# Patient Record
Sex: MALE | Race: WHITE | NOT HISPANIC OR LATINO | Employment: FULL TIME | URBAN - NONMETROPOLITAN AREA
[De-identification: names, ages, dates, MRNs, and addresses within clinical notes are randomized per-mention and may not be internally consistent; named-entity substitution may affect disease eponyms.]

---

## 2017-05-06 ENCOUNTER — APPOINTMENT (OUTPATIENT)
Dept: GENERAL RADIOLOGY | Facility: HOSPITAL | Age: 42
End: 2017-05-06

## 2017-05-06 ENCOUNTER — APPOINTMENT (OUTPATIENT)
Dept: CT IMAGING | Facility: HOSPITAL | Age: 42
End: 2017-05-06

## 2017-05-06 ENCOUNTER — HOSPITAL ENCOUNTER (EMERGENCY)
Facility: HOSPITAL | Age: 42
Discharge: HOME | End: 2017-05-06
Attending: EMERGENCY MEDICINE | Admitting: EMERGENCY MEDICINE

## 2017-05-06 VITALS
TEMPERATURE: 98.7 F | BODY MASS INDEX: 22.73 KG/M2 | OXYGEN SATURATION: 99 % | SYSTOLIC BLOOD PRESSURE: 118 MMHG | HEART RATE: 78 BPM | WEIGHT: 150 LBS | DIASTOLIC BLOOD PRESSURE: 77 MMHG | RESPIRATION RATE: 18 BRPM | HEIGHT: 68 IN

## 2017-05-06 DIAGNOSIS — S42.254A CLOSED NONDISPLACED FRACTURE OF GREATER TUBEROSITY OF RIGHT HUMERUS, INITIAL ENCOUNTER: Primary | ICD-10-CM

## 2017-05-06 DIAGNOSIS — S46.001A ROTATOR CUFF INJURY, RIGHT, INITIAL ENCOUNTER: ICD-10-CM

## 2017-05-06 LAB
ALBUMIN SERPL-MCNC: 4.3 G/DL (ref 3.5–5)
ALBUMIN/GLOB SERPL: 1.3 G/DL (ref 1.5–2.5)
ALP SERPL-CCNC: 93 U/L (ref 40–129)
ALT SERPL W P-5'-P-CCNC: 39 U/L (ref 10–44)
ANION GAP SERPL CALCULATED.3IONS-SCNC: 3 MMOL/L (ref 3.6–11.2)
AST SERPL-CCNC: 43 U/L (ref 10–34)
BASOPHILS # BLD AUTO: 0.03 10*3/MM3 (ref 0–0.3)
BASOPHILS NFR BLD AUTO: 0.2 % (ref 0–2)
BILIRUB SERPL-MCNC: 0.5 MG/DL (ref 0.2–1.8)
BUN BLD-MCNC: 9 MG/DL (ref 7–21)
BUN/CREAT SERPL: 11.1 (ref 7–25)
CALCIUM SPEC-SCNC: 9.9 MG/DL (ref 7.7–10)
CHLORIDE SERPL-SCNC: 107 MMOL/L (ref 99–112)
CO2 SERPL-SCNC: 29 MMOL/L (ref 24.3–31.9)
CREAT BLD-MCNC: 0.81 MG/DL (ref 0.43–1.29)
DEPRECATED RDW RBC AUTO: 43.9 FL (ref 37–54)
EOSINOPHIL # BLD AUTO: 0.26 10*3/MM3 (ref 0–0.7)
EOSINOPHIL NFR BLD AUTO: 2 % (ref 0–5)
ERYTHROCYTE [DISTWIDTH] IN BLOOD BY AUTOMATED COUNT: 13.4 % (ref 11.5–14.5)
GFR SERPL CREATININE-BSD FRML MDRD: 105 ML/MIN/1.73
GLOBULIN UR ELPH-MCNC: 3.3 GM/DL
GLUCOSE BLD-MCNC: 153 MG/DL (ref 70–110)
HCT VFR BLD AUTO: 44.2 % (ref 42–52)
HGB BLD-MCNC: 14.6 G/DL (ref 14–18)
IMM GRANULOCYTES # BLD: 0.04 10*3/MM3 (ref 0–0.03)
IMM GRANULOCYTES NFR BLD: 0.3 % (ref 0–0.5)
LYMPHOCYTES # BLD AUTO: 3.15 10*3/MM3 (ref 1–3)
LYMPHOCYTES NFR BLD AUTO: 24.8 % (ref 21–51)
MCH RBC QN AUTO: 30.2 PG (ref 27–33)
MCHC RBC AUTO-ENTMCNC: 33 G/DL (ref 33–37)
MCV RBC AUTO: 91.3 FL (ref 80–94)
MONOCYTES # BLD AUTO: 0.61 10*3/MM3 (ref 0.1–0.9)
MONOCYTES NFR BLD AUTO: 4.8 % (ref 0–10)
NEUTROPHILS # BLD AUTO: 8.6 10*3/MM3 (ref 1.4–6.5)
NEUTROPHILS NFR BLD AUTO: 67.9 % (ref 30–70)
OSMOLALITY SERPL CALC.SUM OF ELEC: 279.3 MOSM/KG (ref 273–305)
PLATELET # BLD AUTO: 227 10*3/MM3 (ref 130–400)
PMV BLD AUTO: 10.9 FL (ref 6–10)
POTASSIUM BLD-SCNC: 3.8 MMOL/L (ref 3.5–5.3)
PROT SERPL-MCNC: 7.6 G/DL (ref 6–8)
RBC # BLD AUTO: 4.84 10*6/MM3 (ref 4.7–6.1)
SODIUM BLD-SCNC: 139 MMOL/L (ref 135–153)
WBC NRBC COR # BLD: 12.69 10*3/MM3 (ref 4.5–12.5)

## 2017-05-06 PROCEDURE — 96372 THER/PROPH/DIAG INJ SC/IM: CPT

## 2017-05-06 PROCEDURE — 70450 CT HEAD/BRAIN W/O DYE: CPT | Performed by: RADIOLOGY

## 2017-05-06 PROCEDURE — 85025 COMPLETE CBC W/AUTO DIFF WBC: CPT | Performed by: NURSE PRACTITIONER

## 2017-05-06 PROCEDURE — 99283 EMERGENCY DEPT VISIT LOW MDM: CPT

## 2017-05-06 PROCEDURE — 73030 X-RAY EXAM OF SHOULDER: CPT | Performed by: RADIOLOGY

## 2017-05-06 PROCEDURE — 25010000002 KETOROLAC TROMETHAMINE PER 15 MG: Performed by: NURSE PRACTITIONER

## 2017-05-06 PROCEDURE — 70450 CT HEAD/BRAIN W/O DYE: CPT

## 2017-05-06 PROCEDURE — 80053 COMPREHEN METABOLIC PANEL: CPT | Performed by: NURSE PRACTITIONER

## 2017-05-06 PROCEDURE — 73030 X-RAY EXAM OF SHOULDER: CPT

## 2017-05-06 RX ORDER — HYDROCODONE BITARTRATE AND ACETAMINOPHEN 7.5; 325 MG/1; MG/1
1 TABLET ORAL EVERY 6 HOURS PRN
Qty: 12 TABLET | Refills: 0 | Status: SHIPPED | OUTPATIENT
Start: 2017-05-06 | End: 2023-01-23

## 2017-05-06 RX ORDER — KETOROLAC TROMETHAMINE 30 MG/ML
30 INJECTION, SOLUTION INTRAMUSCULAR; INTRAVENOUS ONCE
Status: COMPLETED | OUTPATIENT
Start: 2017-05-06 | End: 2017-05-06

## 2017-05-06 RX ADMIN — KETOROLAC TROMETHAMINE 30 MG: 30 INJECTION, SOLUTION INTRAMUSCULAR; INTRAVENOUS at 17:51

## 2017-05-22 ENCOUNTER — HOSPITAL ENCOUNTER (OUTPATIENT)
Dept: HOSPITAL 79 - CT | Age: 42
End: 2017-05-22
Attending: NURSE PRACTITIONER
Payer: COMMERCIAL

## 2017-05-22 DIAGNOSIS — S42.131A: ICD-10-CM

## 2017-05-22 DIAGNOSIS — M24.411: ICD-10-CM

## 2017-05-22 DIAGNOSIS — S52.109A: Primary | ICD-10-CM

## 2017-05-22 DIAGNOSIS — S42.291A: ICD-10-CM

## 2017-05-31 ENCOUNTER — HOSPITAL ENCOUNTER (OUTPATIENT)
Dept: HOSPITAL 79 - OR | Age: 42
Discharge: HOME | End: 2017-05-31
Attending: ORTHOPAEDIC SURGERY
Payer: COMMERCIAL

## 2017-05-31 VITALS — HEIGHT: 68 IN | WEIGHT: 150 LBS | BODY MASS INDEX: 22.73 KG/M2

## 2017-05-31 DIAGNOSIS — Z79.1: ICD-10-CM

## 2017-05-31 DIAGNOSIS — E11.9: ICD-10-CM

## 2017-05-31 DIAGNOSIS — S42.91XA: Primary | ICD-10-CM

## 2017-05-31 DIAGNOSIS — S46.011A: ICD-10-CM

## 2017-05-31 DIAGNOSIS — Z79.899: ICD-10-CM
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
## 2017-05-31 DIAGNOSIS — M19.90: ICD-10-CM

## 2017-05-31 DIAGNOSIS — F17.210: ICD-10-CM

## 2017-05-31 LAB
BUN/CREATININE RATIO: 13 (ref 0–10)
HGB BLD-MCNC: 15.1 GM/DL (ref 14–17.5)
RED BLOOD COUNT: 4.92 M/UL (ref 4.2–5.5)
WHITE BLOOD COUNT: 5.4 K/UL (ref 4.5–11)

## 2017-05-31 PROCEDURE — 0RQJ0ZZ REPAIR RIGHT SHOULDER JOINT, OPEN APPROACH: ICD-10-PCS | Performed by: ORTHOPAEDIC SURGERY

## 2017-05-31 PROCEDURE — 0PSC04Z REPOSITION RIGHT HUMERAL HEAD WITH INTERNAL FIXATION DEVICE, OPEN APPROACH: ICD-10-PCS | Performed by: ORTHOPAEDIC SURGERY

## 2018-10-12 ENCOUNTER — APPOINTMENT (OUTPATIENT)
Dept: CT IMAGING | Facility: HOSPITAL | Age: 43
End: 2018-10-12

## 2018-10-12 ENCOUNTER — HOSPITAL ENCOUNTER (EMERGENCY)
Facility: HOSPITAL | Age: 43
Discharge: HOME | End: 2018-10-12
Attending: EMERGENCY MEDICINE | Admitting: EMERGENCY MEDICINE

## 2018-10-12 ENCOUNTER — APPOINTMENT (OUTPATIENT)
Dept: GENERAL RADIOLOGY | Facility: HOSPITAL | Age: 43
End: 2018-10-12

## 2018-10-12 VITALS
SYSTOLIC BLOOD PRESSURE: 136 MMHG | HEIGHT: 68 IN | WEIGHT: 165 LBS | HEART RATE: 68 BPM | RESPIRATION RATE: 16 BRPM | BODY MASS INDEX: 25.01 KG/M2 | OXYGEN SATURATION: 99 % | DIASTOLIC BLOOD PRESSURE: 92 MMHG | TEMPERATURE: 97.9 F

## 2018-10-12 DIAGNOSIS — L02.11 NECK ABSCESS: Primary | ICD-10-CM

## 2018-10-12 LAB
ALBUMIN SERPL-MCNC: 4.5 G/DL (ref 3.5–5)
ALBUMIN/GLOB SERPL: 1.4 G/DL (ref 1.5–2.5)
ALP SERPL-CCNC: 103 U/L (ref 40–129)
ALT SERPL W P-5'-P-CCNC: 42 U/L (ref 10–44)
ANION GAP SERPL CALCULATED.3IONS-SCNC: 3.3 MMOL/L (ref 3.6–11.2)
AST SERPL-CCNC: 38 U/L (ref 10–34)
BASOPHILS # BLD AUTO: 0.02 10*3/MM3 (ref 0–0.3)
BASOPHILS NFR BLD AUTO: 0.3 % (ref 0–2)
BILIRUB SERPL-MCNC: 0.4 MG/DL (ref 0.2–1.8)
BUN BLD-MCNC: 9 MG/DL (ref 7–21)
BUN/CREAT SERPL: 9.5 (ref 7–25)
CALCIUM SPEC-SCNC: 9.4 MG/DL (ref 7.7–10)
CHLORIDE SERPL-SCNC: 111 MMOL/L (ref 99–112)
CO2 SERPL-SCNC: 26.7 MMOL/L (ref 24.3–31.9)
CREAT BLD-MCNC: 0.95 MG/DL (ref 0.43–1.29)
DEPRECATED RDW RBC AUTO: 42.7 FL (ref 37–54)
EOSINOPHIL # BLD AUTO: 0.24 10*3/MM3 (ref 0–0.7)
EOSINOPHIL NFR BLD AUTO: 3.2 % (ref 0–5)
ERYTHROCYTE [DISTWIDTH] IN BLOOD BY AUTOMATED COUNT: 13 % (ref 11.5–14.5)
GFR SERPL CREATININE-BSD FRML MDRD: 87 ML/MIN/1.73
GLOBULIN UR ELPH-MCNC: 3.3 GM/DL
GLUCOSE BLD-MCNC: 186 MG/DL (ref 70–110)
HCT VFR BLD AUTO: 46.3 % (ref 42–52)
HGB BLD-MCNC: 15.9 G/DL (ref 14–18)
IMM GRANULOCYTES # BLD: 0.02 10*3/MM3 (ref 0–0.03)
IMM GRANULOCYTES NFR BLD: 0.3 % (ref 0–0.5)
LYMPHOCYTES # BLD AUTO: 3.23 10*3/MM3 (ref 1–3)
LYMPHOCYTES NFR BLD AUTO: 43.1 % (ref 21–51)
MCH RBC QN AUTO: 31.1 PG (ref 27–33)
MCHC RBC AUTO-ENTMCNC: 34.3 G/DL (ref 33–37)
MCV RBC AUTO: 90.6 FL (ref 80–94)
MONOCYTES # BLD AUTO: 0.38 10*3/MM3 (ref 0.1–0.9)
MONOCYTES NFR BLD AUTO: 5.1 % (ref 0–10)
NEUTROPHILS # BLD AUTO: 3.6 10*3/MM3 (ref 1.4–6.5)
NEUTROPHILS NFR BLD AUTO: 48 % (ref 30–70)
OSMOLALITY SERPL CALC.SUM OF ELEC: 284.8 MOSM/KG (ref 273–305)
PLATELET # BLD AUTO: 215 10*3/MM3 (ref 130–400)
PMV BLD AUTO: 11.3 FL (ref 6–10)
POTASSIUM BLD-SCNC: 4.1 MMOL/L (ref 3.5–5.3)
PROT SERPL-MCNC: 7.8 G/DL (ref 6–8)
RBC # BLD AUTO: 5.11 10*6/MM3 (ref 4.7–6.1)
SODIUM BLD-SCNC: 141 MMOL/L (ref 135–153)
WBC NRBC COR # BLD: 7.49 10*3/MM3 (ref 4.5–12.5)

## 2018-10-12 PROCEDURE — 85025 COMPLETE CBC W/AUTO DIFF WBC: CPT | Performed by: PHYSICIAN ASSISTANT

## 2018-10-12 PROCEDURE — 99283 EMERGENCY DEPT VISIT LOW MDM: CPT

## 2018-10-12 PROCEDURE — 70491 CT SOFT TISSUE NECK W/DYE: CPT

## 2018-10-12 PROCEDURE — 25010000002 IOPAMIDOL 61 % SOLUTION: Performed by: EMERGENCY MEDICINE

## 2018-10-12 PROCEDURE — 71046 X-RAY EXAM CHEST 2 VIEWS: CPT

## 2018-10-12 PROCEDURE — 80053 COMPREHEN METABOLIC PANEL: CPT | Performed by: PHYSICIAN ASSISTANT

## 2018-10-12 PROCEDURE — 71046 X-RAY EXAM CHEST 2 VIEWS: CPT | Performed by: RADIOLOGY

## 2018-10-12 PROCEDURE — 70491 CT SOFT TISSUE NECK W/DYE: CPT | Performed by: RADIOLOGY

## 2018-10-12 RX ORDER — DOXYCYCLINE 100 MG/1
100 CAPSULE ORAL 2 TIMES DAILY
Qty: 20 CAPSULE | Refills: 0 | Status: SHIPPED | OUTPATIENT
Start: 2018-10-12 | End: 2023-01-23

## 2018-10-12 RX ORDER — CEPHALEXIN 500 MG/1
500 CAPSULE ORAL 3 TIMES DAILY
Qty: 30 CAPSULE | Refills: 0 | Status: SHIPPED | OUTPATIENT
Start: 2018-10-12 | End: 2023-01-23

## 2018-10-12 RX ORDER — SODIUM CHLORIDE 0.9 % (FLUSH) 0.9 %
10 SYRINGE (ML) INJECTION AS NEEDED
Status: DISCONTINUED | OUTPATIENT
Start: 2018-10-12 | End: 2018-10-12 | Stop reason: HOSPADM

## 2018-10-12 RX ORDER — LIDOCAINE HYDROCHLORIDE 10 MG/ML
5 INJECTION, SOLUTION EPIDURAL; INFILTRATION; INTRACAUDAL; PERINEURAL ONCE
Status: COMPLETED | OUTPATIENT
Start: 2018-10-12 | End: 2018-10-12

## 2018-10-12 RX ADMIN — IOPAMIDOL 90 ML: 612 INJECTION, SOLUTION INTRAVENOUS at 15:31

## 2018-10-12 RX ADMIN — LIDOCAINE HYDROCHLORIDE 5 ML: 10 INJECTION, SOLUTION EPIDURAL; INFILTRATION; INTRACAUDAL; PERINEURAL at 16:08

## 2018-10-12 NOTE — DISCHARGE INSTRUCTIONS
Call one of the offices below to establish a primary care provider.  If you are unable to get an appointment and feel it is an emergency and need to be seen immediately please return to the Emergency Department.    Call one of the office below to set up a primary care provider.    Dr. Ganesh Garcia                                                                                                       [address] [facility] [address]  [phone]    Dr. Yepez, Dr. REKHA Thompson, Dr. JOSEFA Thompson ([facility])  [address] [address]  [phone]    Dr. Lr, Dr. Pino, Dr. Guzmán ([facility])  [address] [facility] [address]  [phone]    Dr. Wilson  [address] [facility] [address]  [phone]    Dr. Fletcher, Dr. Valle, Dr. José, Dr. Pantoja ([facility])  [address] [place] [address] [address]  [facility] [address]  [phone]    Dr. Madelyn Funez  [address] [facility] [place] [address]  [phone]    Dr. Ivanna Johnston  [address]   [address]  [facility] [address]  [phone]    Dr. Garcia  [address] [facility] [address]  [phone]    Dr. Padron, Dr. Ba  [address] [facility] [place] [address]  [phone]    Dr. Bartlett  [address]                                                              [address]  [facility] [address]  [phone]    Dr. Springer  [address] [facility] [id number] [phone]    Dr. Della Neri  [address]  [address]  [facility] [address]  [phone]

## 2018-10-12 NOTE — ED PROVIDER NOTES
Subjective   43-year-old white male presents secondary to a swollen area in his anterior neck.  He states this started 3 days ago.  It has increased.  He denies any fever.  No redness or pus.  He voices no other complaints at this time.            Review of Systems   Constitutional: Negative.  Negative for fever.   HENT: Negative.    Respiratory: Negative.    Cardiovascular: Negative.  Negative for chest pain.   Gastrointestinal: Negative.  Negative for abdominal pain.   Endocrine: Negative.    Genitourinary: Negative.  Negative for dysuria.   Skin: Negative.    Neurological: Negative.    Psychiatric/Behavioral: Negative.    All other systems reviewed and are negative.      Past Medical History:   Diagnosis Date   • Bladder infection    • Compression fracture of thoracic vertebra (CMS/HCC)    • Migraine    • Prostate enlargement        No Known Allergies    Past Surgical History:   Procedure Laterality Date   • ORBITAL FRACTURE SURGERY     • SHOULDER ARTHROSCOPY W/ ROTATOR CUFF REPAIR     • SINUS SURGERY         Family History   Problem Relation Age of Onset   • Hypertension Mother    • Cancer Maternal Uncle    • Cancer Maternal Grandmother        Social History     Social History   • Marital status:      Social History Main Topics   • Smoking status: Current Every Day Smoker     Packs/day: 1.00     Types: Cigarettes   • Smokeless tobacco: Never Used   • Alcohol use No   • Drug use: No   • Sexual activity: Defer     Other Topics Concern   • Not on file           Objective   Physical Exam   Constitutional: He is oriented to person, place, and time. He appears well-developed and well-nourished. No distress.   HENT:   Head: Normocephalic and atraumatic.   Right Ear: External ear normal.   Left Ear: External ear normal.   Nose: Nose normal.   Quarter size area of firm swollen mass midline of his anterior neck.  No definite abscess.  No surrounding redness.  I do not appreciate any lymphadenopathy in his neck or  axilla or supraclavicular regions.   Eyes: Pupils are equal, round, and reactive to light. Conjunctivae and EOM are normal.   Neck: Normal range of motion. Neck supple. No JVD present. No tracheal deviation present.   Cardiovascular: Normal rate, regular rhythm and normal heart sounds.    No murmur heard.  Pulmonary/Chest: Effort normal and breath sounds normal. No respiratory distress. He has no wheezes.   Abdominal: Soft. Bowel sounds are normal. There is no tenderness.   Musculoskeletal: Normal range of motion. He exhibits no edema or deformity.   Neurological: He is alert and oriented to person, place, and time. No cranial nerve deficit.   Skin: Skin is warm and dry. No rash noted. He is not diaphoretic. No erythema. No pallor.   Psychiatric: He has a normal mood and affect. His behavior is normal. Thought content normal.   Nursing note and vitals reviewed.      Incision & Drainage  Date/Time: 10/12/2018 6:11 PM  Performed by: ONEIL BONILLA  Authorized by: NAYELI LAWRENCE     Location:     Type:  Abscess    Location:  Neck    Neck location: anterior.  Pre-procedure details:     Skin preparation:  Betadine  Anesthesia (see MAR for exact dosages):     Anesthesia method:  Local infiltration    Local anesthetic:  Lidocaine 1% w/o epi  Procedure type:     Complexity:  Simple  Procedure details:     Incision types:  Single straight    Incision depth:  Subcutaneous    Scalpel blade:  11    Drainage:  Purulent    Drainage amount:  Moderate    Wound treatment:  Wound left open    Packing materials:  None  Post-procedure details:     Patient tolerance of procedure:  Tolerated well, no immediate complications               ED Course  ED Course as of Oct 12 1812   Fri Oct 12, 2018   1811 Seen with Dr Lawrence-proceed with I & D.  [JI]      ED Course User Index  [JI] Oneil Bonilla PA                  MDM  Number of Diagnoses or Management Options  Neck abscess: new and requires workup     Amount and/or Complexity of Data  Reviewed  Clinical lab tests: reviewed and ordered  Tests in the radiology section of CPT®: reviewed and ordered  Discuss the patient with other providers: yes    Risk of Complications, Morbidity, and/or Mortality  Presenting problems: moderate          Final diagnoses:   Neck abscess            Paulo Bonilla PA  10/12/18 1815

## 2021-03-29 ENCOUNTER — IMMUNIZATION (OUTPATIENT)
Dept: VACCINE CLINIC | Facility: HOSPITAL | Age: 46
End: 2021-03-29

## 2021-03-29 PROCEDURE — 0001A: CPT | Performed by: INTERNAL MEDICINE

## 2021-03-29 PROCEDURE — 91300 HC SARSCOV02 VAC 30MCG/0.3ML IM: CPT | Performed by: INTERNAL MEDICINE

## 2021-04-22 ENCOUNTER — IMMUNIZATION (OUTPATIENT)
Dept: VACCINE CLINIC | Facility: HOSPITAL | Age: 46
End: 2021-04-22

## 2021-04-22 PROCEDURE — 91300 HC SARSCOV02 VAC 30MCG/0.3ML IM: CPT | Performed by: INTERNAL MEDICINE

## 2021-04-22 PROCEDURE — 0002A: CPT | Performed by: INTERNAL MEDICINE

## 2021-08-16 ENCOUNTER — TRANSCRIBE ORDERS (OUTPATIENT)
Dept: ADMINISTRATIVE | Facility: HOSPITAL | Age: 46
End: 2021-08-16

## 2021-08-16 DIAGNOSIS — R10.9 ABDOMINAL PAIN, UNSPECIFIED ABDOMINAL LOCATION: Primary | ICD-10-CM

## 2021-08-26 ENCOUNTER — TRANSCRIBE ORDERS (OUTPATIENT)
Dept: ADMINISTRATIVE | Facility: HOSPITAL | Age: 46
End: 2021-08-26

## 2021-08-26 ENCOUNTER — LAB (OUTPATIENT)
Dept: LAB | Facility: HOSPITAL | Age: 46
End: 2021-08-26

## 2021-08-26 DIAGNOSIS — Z11.59 SPECIAL SCREENING EXAMINATION FOR UNSPECIFIED VIRAL DISEASE: Primary | ICD-10-CM

## 2021-08-26 DIAGNOSIS — Z11.59 SPECIAL SCREENING EXAMINATION FOR UNSPECIFIED VIRAL DISEASE: ICD-10-CM

## 2021-08-26 PROCEDURE — C9803 HOPD COVID-19 SPEC COLLECT: HCPCS | Performed by: INTERNAL MEDICINE

## 2021-08-26 PROCEDURE — U0004 COV-19 TEST NON-CDC HGH THRU: HCPCS | Performed by: INTERNAL MEDICINE

## 2021-08-27 LAB — SARS-COV-2 RNA NOSE QL NAA+PROBE: NOT DETECTED

## 2022-09-26 ENCOUNTER — OFFICE VISIT (OUTPATIENT)
Dept: FAMILY MEDICINE CLINIC | Facility: CLINIC | Age: 47
End: 2022-09-26

## 2022-09-26 DIAGNOSIS — Z02.89 EXAMINATION, PHYSICAL, EMPLOYEE: Primary | ICD-10-CM

## 2022-09-26 PROCEDURE — 81005 URINALYSIS: CPT | Performed by: NURSE PRACTITIONER

## 2022-09-26 PROCEDURE — DOTPHY: Performed by: NURSE PRACTITIONER

## 2022-09-26 PROCEDURE — 99173 VISUAL ACUITY SCREEN: CPT | Performed by: NURSE PRACTITIONER

## 2022-10-21 ENCOUNTER — TRANSCRIBE ORDERS (OUTPATIENT)
Dept: ADMINISTRATIVE | Facility: HOSPITAL | Age: 47
End: 2022-10-21

## 2022-10-21 DIAGNOSIS — M25.562 LEFT KNEE PAIN, UNSPECIFIED CHRONICITY: Primary | ICD-10-CM

## 2022-11-15 ENCOUNTER — HOSPITAL ENCOUNTER (OUTPATIENT)
Dept: MRI IMAGING | Facility: HOSPITAL | Age: 47
Discharge: HOME | End: 2022-11-15
Admitting: PHYSICAL MEDICINE & REHABILITATION

## 2022-11-15 DIAGNOSIS — M25.562 LEFT KNEE PAIN, UNSPECIFIED CHRONICITY: ICD-10-CM

## 2022-11-15 PROCEDURE — 73721 MRI JNT OF LWR EXTRE W/O DYE: CPT | Performed by: RADIOLOGY

## 2022-11-15 PROCEDURE — 73721 MRI JNT OF LWR EXTRE W/O DYE: CPT

## 2023-01-23 ENCOUNTER — TELEPHONE (OUTPATIENT)
Dept: ORTHOPEDIC SURGERY | Facility: CLINIC | Age: 48
End: 2023-01-23

## 2023-01-23 ENCOUNTER — OFFICE VISIT (OUTPATIENT)
Dept: ORTHOPEDIC SURGERY | Facility: CLINIC | Age: 48
End: 2023-01-23
Payer: OTHER MISCELLANEOUS

## 2023-01-23 VITALS
HEIGHT: 68 IN | WEIGHT: 139.6 LBS | BODY MASS INDEX: 21.16 KG/M2 | DIASTOLIC BLOOD PRESSURE: 78 MMHG | SYSTOLIC BLOOD PRESSURE: 138 MMHG

## 2023-01-23 DIAGNOSIS — Z02.6 ENCOUNTER RELATED TO WORKER'S COMPENSATION CLAIM: ICD-10-CM

## 2023-01-23 DIAGNOSIS — M25.562 LEFT KNEE PAIN, UNSPECIFIED CHRONICITY: Primary | ICD-10-CM

## 2023-01-23 DIAGNOSIS — S83.242A ACUTE MEDIAL MENISCUS TEAR OF LEFT KNEE, INITIAL ENCOUNTER: ICD-10-CM

## 2023-01-23 PROCEDURE — 99204 OFFICE O/P NEW MOD 45 MIN: CPT | Performed by: ORTHOPAEDIC SURGERY

## 2023-01-23 RX ORDER — TRAZODONE HYDROCHLORIDE 50 MG/1
TABLET ORAL
COMMUNITY
Start: 2022-12-27

## 2023-01-23 RX ORDER — BUPRENORPHINE HYDROCHLORIDE AND NALOXONE HYDROCHLORIDE DIHYDRATE 8; 2 MG/1; MG/1
TABLET SUBLINGUAL
COMMUNITY
Start: 2022-12-27

## 2023-01-23 RX ORDER — DICLOFENAC SODIUM 75 MG/1
TABLET, DELAYED RELEASE ORAL
COMMUNITY
Start: 2022-12-27

## 2023-01-23 RX ORDER — FEXOFENADINE HCL 180 MG/1
TABLET ORAL
COMMUNITY
Start: 2022-12-27

## 2023-01-23 NOTE — PROGRESS NOTES
[facility] Note        Subjective     Pain of the Left Knee      HPI    Juan Capone is a 47 y.o. male.  He had a twisting injury to his left knee October 1, 2022.  He was at work.  He works in production.  He has been on a seated job only since then.  He did a home exercise program.  He had an MRI November 15.  He has been trying conservative management and is here for surgical consultation.  He had no pre-existing problems with his knee prior to the work injury.    Past Medical History:   Diagnosis Date   • Bladder infection    • Compression fracture of thoracic vertebra (HCC)    • Migraine    • Prostate enlargement       Past Surgical History:   Procedure Laterality Date   • ORBITAL FRACTURE SURGERY Left    • SHOULDER ARTHROSCOPY W/ ROTATOR CUFF REPAIR Right     Dr. Bedolla in [place]   • SINUS SURGERY        Family History   Problem Relation Age of Onset   • Hypertension Mother    • Cancer Maternal Uncle    • Cancer Maternal Grandmother      Social History     Socioeconomic History   • Marital status:    Tobacco Use   • Smoking status: Every Day     Packs/day: 1.00     Types: Cigarettes   • Smokeless tobacco: Never   Vaping Use   • Vaping Use: Never used   Substance and Sexual Activity   • Alcohol use: No   • Drug use: No   • Sexual activity: Defer      Current Outpatient Medications on File Prior to Visit   Medication Sig Dispense Refill   • buprenorphine-naloxone (SUBOXONE) 8-2 MG per SL tablet      • diclofenac (VOLTAREN) 75 MG EC tablet      • fexofenadine (ALLEGRA) 180 MG tablet      • gabapentin (NEURONTIN) 400 MG capsule Take 300 mg by mouth 3 (three) times a day.     • traZODone (DESYREL) 50 MG tablet      • [DISCONTINUED] cephalexin (KEFLEX) 500 MG capsule Take 1 capsule by mouth 3 (Three) Times a Day. 30 capsule 0   • [DISCONTINUED] doxycycline (MONODOX) 100 MG capsule Take 1 capsule by mouth 2 (Two) Times a Day. 20 capsule 0   • [DISCONTINUED] HYDROcodone-acetaminophen  "(NORCO) 7.5-325 MG per tablet Take 1 tablet by mouth Every 6 (Six) Hours As Needed for Moderate Pain (4-6). 12 tablet 0   • [DISCONTINUED] ketorolac (TORADOL) 10 MG tablet Take 10 mg by mouth every 6 (six) hours as needed for moderate pain (4-6).     • [DISCONTINUED] mirtazapine (REMERON) 30 MG tablet Take 30 mg by mouth every night.     • [DISCONTINUED] tamsulosin (FLOMAX) 0.4 MG capsule 24 hr capsule Take 1 capsule by mouth every night.       No current facility-administered medications on file prior to visit.      No Known Allergies       Review of Systems   Constitutional: Negative.    HENT: Negative.    Eyes: Negative.    Respiratory: Negative.    Cardiovascular: Negative.    Gastrointestinal: Negative.    Endocrine: Negative.    Genitourinary: Negative.    Musculoskeletal: Positive for arthralgias and joint swelling.   Skin: Negative.    Allergic/Immunologic: Negative.    Neurological: Negative.    Hematological: Negative.    Psychiatric/Behavioral: Negative.         I reviewed the patient's chief complaint, history of present illness, review of systems, past medical history, surgical history, family history, social history, medications and allergy list.        Objective      Physical Exam  /78   Ht 172.7 cm (67.99\")   Wt 63.3 kg (139 lb 9.6 oz)   BMI 21.23 kg/m²     Body mass index is 21.23 kg/m².    General  Mental Status - alert  General Appearance - cooperative, well groomed, not in acute distress  Orientation - Oriented X3  Build & Nutrition - well developed and well nourished  Posture - normal posture  Gait - normal gait       Ortho Exam  Left knee has no swelling no effusion.  He is tender medial joint line with positive Mino.  Stable ligaments.  Full motion.    Imaging/Studies  Imaging Results (Last 24 Hours)     Procedure Component Value Units Date/Time    XR Knee 4+ View Left [[id number]] Resulted: 01/23/23 1456     Updated: 01/23/23 1456    Narrative:      Knee X-Ray  Indication: " Pain    Upright AP of bilateral knees. Lateral, skiers and Sunrise views of left   knee     Findings:  No fracture  No bony lesion  Normal soft tissues  Normal joint spaces    No prior studies were available for comparison.        I viewed his MRI from November 15 which shows medial meniscus tear and bone bruise.    Assessment    Assessment:  1. Left knee pain, unspecified chronicity    2. Encounter related to worker's compensation claim    3. Acute medial meniscus tear of left knee, initial encounter        Plan:  The plan is for left knee arthroscopy with partial medial meniscectomy.Treatment options and alternatives were discussed with patient.  He has failed more than 2 months of conservative management including home exercise program.  Work restrictions.  He has tried NSAIDs.  Surgical risks include but are not limited to pain, bleeding, infection, failure to relieve symptoms, need for further procedures, recurrence of symptoms, damage to healthy adjacent structures, hardware loosening/failure, stiffness, weakness, scar, blood clots/DVT/PE, loss of limb or life. We also discussed the postoperative protocol and expected outcome although no guarantees are possible with surgery. All questions were answered; the patient would like to proceed with surgical intervention.        Francesco Ortiz MD  01/23/23  15:03 EST      Dictated Utilizing Dragon Dictation.

## 2023-01-31 ENCOUNTER — TELEPHONE (OUTPATIENT)
Dept: ORTHOPEDIC SURGERY | Facility: CLINIC | Age: 48
End: 2023-01-31
Payer: COMMERCIAL

## 2023-01-31 NOTE — TELEPHONE ENCOUNTER
SURGERY SCHEDULING ATTEMPTED TO REACH  OUT TO PATIENT TO GET HIM SCHEDULED FOR SURGERY . NO ANSWER ON MAIN NUMBER WILL TRY AGAIN LATER . REACHED OUT TO PATIENT ON  1/31/2023 .

## 2023-02-09 ENCOUNTER — OUTSIDE FACILITY SERVICE (OUTPATIENT)
Dept: ORTHOPEDIC SURGERY | Facility: CLINIC | Age: 48
End: 2023-02-09
Payer: OTHER MISCELLANEOUS

## 2023-02-09 DIAGNOSIS — Z98.890 S/P LEFT KNEE ARTHROSCOPY: Primary | ICD-10-CM

## 2023-02-09 PROCEDURE — 29881 ARTHRS KNE SRG MNISECTMY M/L: CPT | Performed by: ORTHOPAEDIC SURGERY

## 2023-02-09 RX ORDER — ONDANSETRON 4 MG/1
4 TABLET, FILM COATED ORAL EVERY 8 HOURS PRN
Qty: 10 TABLET | Refills: 1 | Status: SHIPPED | OUTPATIENT
Start: 2023-02-09

## 2023-02-15 ENCOUNTER — OFFICE VISIT (OUTPATIENT)
Dept: ORTHOPEDIC SURGERY | Facility: CLINIC | Age: 48
End: 2023-02-15
Payer: OTHER MISCELLANEOUS

## 2023-02-15 VITALS — TEMPERATURE: 97.8 F

## 2023-02-15 DIAGNOSIS — Z98.890 S/P LEFT KNEE ARTHROSCOPY: Primary | ICD-10-CM

## 2023-02-15 PROCEDURE — 99024 POSTOP FOLLOW-UP VISIT: CPT | Performed by: ORTHOPAEDIC SURGERY

## 2023-02-15 NOTE — PROGRESS NOTES
Chief Complaint   Patient presents with   • Post-op     1 week s/p - left knee arthroscopy               HPI      He is doing well with no new complaints    Vitals:    02/15/23 1420   Temp: 97.8 °F (36.6 °C)         Physical Exam:    Left knee looks good.  No swelling no effusion.  Negative Homans' sign        ICD-10-CM ICD-9-CM   1. S/P left knee arthroscopy  Z98.890 V45.89       Orders Placed This Encounter   Procedures   • Ambulatory Referral to Physical Therapy Evaluate and treat       Go to physical therapy.  Work restrictions seated job only or off work.  Follow-up in 3 weeks      Francesco Ortiz M.D., FAAOS  Orthopedic Surgeon  Fellowship Trained Sports Medicine  [facility]  Orthopedics and Sports Medicine  [address] [place] [address], [address]  [place] [address]      EMR Dragon/Transcription disclaimer:  Much of this encounter note is an electronic transcription of spoken language to printed text. Electronic transcription of spoken language may permit erroneous, or at times, nonsensical words or phrases to be inadvertently transcribed. Although I have reviewed the note for such errors, some may still exist.

## 2023-03-15 ENCOUNTER — OFFICE VISIT (OUTPATIENT)
Dept: ORTHOPEDIC SURGERY | Facility: CLINIC | Age: 48
End: 2023-03-15
Payer: OTHER MISCELLANEOUS

## 2023-03-15 DIAGNOSIS — Z98.890 S/P LEFT KNEE ARTHROSCOPY: ICD-10-CM

## 2023-03-15 DIAGNOSIS — Z02.6 ENCOUNTER RELATED TO WORKER'S COMPENSATION CLAIM: Primary | ICD-10-CM

## 2023-03-15 PROCEDURE — 99024 POSTOP FOLLOW-UP VISIT: CPT | Performed by: ORTHOPAEDIC SURGERY

## 2023-03-15 NOTE — PROGRESS NOTES
Chief Complaint   Patient presents with   • Post-op     1 month post op; s/p - left knee arthroscopy           HPI  He is doing well.  He says his knee still gives out from weakness at times but he feels much better.  He is very pleased.      There were no vitals filed for this visit.      Physical Exam:    Left knee with trace effusion.  He walks with a normal gait.  Full motion full-strength.    X-RAY REPORT:  Imaging Results (Last 7 Days)     ** No results found for the last 168 hours. **                ICD-10-CM ICD-9-CM   1. Encounter related to worker's compensation claim  Z02.6 V70.3   2. S/P left knee arthroscopy  Z98.890 V45.89       Orders Placed This Encounter   Procedures   • Ambulatory Referral to Physical Therapy Evaluate and treat     He will start physical therapy now.  He was somewhat confused and did not realize he was supposed to start that last visit.  Continue restrictions seated job only.  He does not feel strong enough to work his regular job full duty.  He is working light duty.  Because he is Worker's Comp. I will see him back in 3 weeks        Francesco Ortiz M.D., FAAOS  Orthopedic Surgeon  Fellowship Trained Sports Medicine  [facility]  Orthopedics and Sports Medicine  [address] [facility], [address]  [place] [address]      EMR Dragon/Transcription disclaimer:  Much of this encounter note is an electronic transcription of spoken language to printed text. Electronic transcription of spoken language may permit erroneous, or at times, nonsensical words or phrases to be inadvertently transcribed. Although I have reviewed the note for such errors, some may still exist.

## 2023-03-20 ENCOUNTER — TRANSCRIBE ORDERS (OUTPATIENT)
Dept: ADMINISTRATIVE | Facility: HOSPITAL | Age: 48
End: 2023-03-20
Payer: COMMERCIAL

## 2023-03-20 DIAGNOSIS — R10.13 ABDOMINAL PAIN, EPIGASTRIC: Primary | ICD-10-CM

## 2023-04-05 ENCOUNTER — OFFICE VISIT (OUTPATIENT)
Dept: ORTHOPEDIC SURGERY | Facility: CLINIC | Age: 48
End: 2023-04-05
Payer: OTHER MISCELLANEOUS

## 2023-04-05 DIAGNOSIS — M25.562 LEFT KNEE PAIN, UNSPECIFIED CHRONICITY: ICD-10-CM

## 2023-04-05 DIAGNOSIS — Z02.6 ENCOUNTER RELATED TO WORKER'S COMPENSATION CLAIM: ICD-10-CM

## 2023-04-05 DIAGNOSIS — Z98.890 S/P LEFT KNEE ARTHROSCOPY: Primary | ICD-10-CM

## 2023-04-05 PROCEDURE — 99024 POSTOP FOLLOW-UP VISIT: CPT | Performed by: ORTHOPAEDIC SURGERY

## 2023-04-05 NOTE — PROGRESS NOTES
Chief Complaint   Patient presents with   • Post-op     5 weeks s/p Left Partial Medial Meniscectomy 2/9/23: WORK COMP-restrictions seated job only, fall 3/26/23           HPI  He was doing well within fell on March 26 at home.  He injured his knee but only had to miss 1 day at work.  He is on restrictions.  He starts physical therapy tomorrow.      There were no vitals filed for this visit.      Physical Exam:  His left knee looks good.  Full motion.  No swelling no effusion.  Negative Homans' sign.      X-RAY REPORT:  Imaging Results (Last 7 Days)     Procedure Component Value Units Date/Time    XR Knee 4+ View Left [[id number]] Resulted: 04/05/23 1448     Updated: 04/05/23 1448    Narrative:      Knee X-Ray  Indication: Pain    Upright AP of bilateral knees. Lateral, skiers and Sunrise views of left   knee     Findings:  No fracture  No bony lesion  Normal soft tissues  Normal joint spaces    No prior studies were available for comparison.                ICD-10-CM ICD-9-CM   1. S/P left knee arthroscopy  Z98.890 V45.89   2. Left knee pain, unspecified chronicity  M25.562 719.46   3. Encounter related to worker's compensation claim  Z02.6 V70.3       Orders Placed This Encounter   Procedures   • XR Knee 4+ View Left   • Ambulatory Referral to Physical Therapy Evaluate and treat     He will go to physical therapy PT pros in [place] starting tomorrow.  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  He may stand for an hour and then sit for an hour alternating on and off.  There is no damage with his fall at home      Francesco Ortiz M.D., FAAOS  Orthopedic Surgeon  Fellowship Trained Sports Medicine  [facility]  Orthopedics and Sports Medicine  [address] [facility], [address]  [place] [address]      EMR Dragon/Transcription disclaimer:  Much of this encounter note is an electronic transcription of spoken language to printed text. Electronic transcription of spoken language may permit  erroneous, or at times, nonsensical words or phrases to be inadvertently transcribed. Although I have reviewed the note for such errors, some may still exist.

## 2023-05-03 ENCOUNTER — OFFICE VISIT (OUTPATIENT)
Dept: ORTHOPEDIC SURGERY | Facility: CLINIC | Age: 48
End: 2023-05-03
Payer: OTHER MISCELLANEOUS

## 2023-05-03 DIAGNOSIS — Z98.890 S/P LEFT KNEE ARTHROSCOPY: Primary | ICD-10-CM

## 2023-05-03 NOTE — PROGRESS NOTES
Chief Complaint   Patient presents with   • Post-op     1 month follow up -- 12 weeks status post left knee arthroscopy with partial medial menisectomies (2/9/23)           HPI  He is doing great no problems.  Date of surgery February 9  He returned to work full duty mid-March    There were no vitals filed for this visit.      Physical Exam:    Left knee with full motion no deficit gait.        ICD-10-CM ICD-9-CM   1. S/P left knee arthroscopy  Z98.890 V45.89     He is doing great.  He is working full duty.  He will follow-up as needed      Francesco Ortiz M.D., [facility]  Orthopedic Surgeon  Fellowship Trained Sports Medicine  [facility]  Orthopedics and Sports Medicine  [address] [facility], [address]  [place] [address]      EMR Dragon/Transcription disclaimer:  Much of this encounter note is an electronic transcription of spoken language to printed text. Electronic transcription of spoken language may permit erroneous, or at times, nonsensical words or phrases to be inadvertently transcribed. Although I have reviewed the note for such errors, some may still exist.   Yes

## 2023-07-27 ENCOUNTER — HOSPITAL ENCOUNTER (OUTPATIENT)
Dept: GENERAL RADIOLOGY | Facility: HOSPITAL | Age: 48
Discharge: HOME | End: 2023-07-27
Admitting: PHYSICIAN ASSISTANT
Payer: COMMERCIAL

## 2023-07-27 ENCOUNTER — OFFICE VISIT (OUTPATIENT)
Dept: ORTHOPEDIC SURGERY | Facility: CLINIC | Age: 48
End: 2023-07-27
Payer: COMMERCIAL

## 2023-07-27 VITALS — BODY MASS INDEX: 21.07 KG/M2 | HEIGHT: 68 IN | WEIGHT: 139 LBS

## 2023-07-27 DIAGNOSIS — S62.366D CLOSED NONDISPLACED FRACTURE OF NECK OF FIFTH METACARPAL BONE OF RIGHT HAND WITH ROUTINE HEALING, SUBSEQUENT ENCOUNTER: ICD-10-CM

## 2023-07-27 DIAGNOSIS — S62.308D: Primary | ICD-10-CM

## 2023-07-27 PROCEDURE — 73130 X-RAY EXAM OF HAND: CPT | Performed by: RADIOLOGY

## 2023-07-27 PROCEDURE — 99024 POSTOP FOLLOW-UP VISIT: CPT | Performed by: PHYSICIAN ASSISTANT

## 2023-07-27 PROCEDURE — 73130 X-RAY EXAM OF HAND: CPT

## 2023-07-27 NOTE — PROGRESS NOTES
[facility] – [place] [facility] Established Patient Visit          Patient: Juan Capone  YOB: 1975  Date of Encounter: 07/27/2023  PCP: Margy Dockery APRN      Subjective     Chief Complaint   Patient presents with    Right Hand - Pain           History of Present Illness:     Juan Capone is a 47 y.o. male presents today result left fourth and fifth metacarpal fractures initial date of injury 6/25/2023.  The patient has responded well with conservative treatment options and presents today for radiographic review out of immobilization.  He reports no new complaints.  Reports decrease in overall swelling and denies any other new complaints.     Patient Active Problem List   Diagnosis    Gross hematuria    Acute prostatitis    Incomplete bladder emptying     Past Medical History:   Diagnosis Date    Bladder infection     Compression fracture of thoracic vertebra     Migraine     Prostate enlargement      Past Surgical History:   Procedure Laterality Date    KNEE ARTHROSCOPY Left 02/09/2023    Left Partial Medial Meniscectomy-Dr Francesco Ortiz-[facility]    ORBITAL FRACTURE SURGERY Left     SHOULDER ARTHROSCOPY W/ ROTATOR CUFF REPAIR Right     Dr. Bedolla in [place]    SINUS SURGERY       Social History     Occupational History    Not on file   Tobacco Use    Smoking status: Every Day     Packs/day: 1.00     Types: Cigarettes    Smokeless tobacco: Never   Vaping Use    Vaping Use: Never used   Substance and Sexual Activity    Alcohol use: No    Drug use: No    Sexual activity: Defer    Juan Capone  reports that he has been smoking cigarettes. He has been smoking an average of 1 pack per day. He has never used smokeless tobacco.. I have educated him on the risk of diseases from using tobacco products such as cancer, COPD, and heart disease.     I advised him to quit and he is not willing to quit.    I spent 3  minutes counseling the patient.          Social History     Social History Narrative  "   Not on file     Family History   Problem Relation Age of Onset    Hypertension Mother     Cancer Maternal Uncle     Cancer Maternal Grandmother      Current Outpatient Medications   Medication Sig Dispense Refill    buprenorphine-naloxone (SUBOXONE) 8-2 MG per SL tablet       diclofenac (VOLTAREN) 75 MG EC tablet       Diclofenac Sodium (VOLTAREN) 1 % gel gel APPLY 2 GRAMS TO THE AFFECTED JOINT(S) FOUR TIMES A DAY AS NEEDED FOR PAIN      fexofenadine (ALLEGRA) 180 MG tablet       gabapentin (NEURONTIN) 400 MG capsule Take 300 mg by mouth 3 (three) times a day.      traZODone (DESYREL) 50 MG tablet        No current facility-administered medications for this visit.     No Known Allergies         Review of Systems   Constitutional: Negative.   HENT: Negative.     Eyes: Negative.    Cardiovascular: Negative.    Respiratory: Negative.          Sleep apnea   Endocrine: Negative.    Hematologic/Lymphatic: Negative.    Skin: Negative.    Musculoskeletal:  Positive for back pain, joint swelling and neck pain.        Pertinent positives listed in HPI   Gastrointestinal: Negative.    Genitourinary: Negative.    Neurological: Negative.    Psychiatric/Behavioral: Negative.     Allergic/Immunologic: Negative.        Objective      Vitals:    07/27/23 1430   Weight: 63 kg (139 lb)   Height: 172.7 cm (67.99\")      BMI is within normal parameters. No other follow-up for BMI required.      Physical Exam  Vitals and nursing note reviewed.   Constitutional:       General: He is not in acute distress.     Appearance: Normal appearance. He is not ill-appearing.   HENT:      Head: Normocephalic and atraumatic.      Right Ear: External ear normal.      Left Ear: External ear normal.      Nose: Nose normal.      Mouth/Throat:      Mouth: Mucous membranes are moist.      Pharynx: Oropharynx is clear.   Eyes:      Extraocular Movements: Extraocular movements intact.      Conjunctiva/sclera: Conjunctivae normal.      Pupils: Pupils are " equal, round, and reactive to light.   Cardiovascular:      Rate and Rhythm: Normal rate.      Pulses: Normal pulses.   Pulmonary:      Effort: Pulmonary effort is normal.   Abdominal:      General: There is no distension.   Musculoskeletal:      Cervical back: Normal range of motion. No rigidity.      Comments: Mild generalized swelling right hand pain to palpation along the fourth and fifth metacarpals.  Patient has mild reduction of prominence of the fourth and fifth MCP joints.  Patient has full intact flexion and extension with no significant rotational abnormality.  Neurovascular status grossly intact right upper extremity.   Skin:     General: Skin is warm and dry.      Capillary Refill: Capillary refill takes less than 2 seconds.   Neurological:      General: No focal deficit present.      Mental Status: He is alert and oriented to person, place, and time.   Psychiatric:         Mood and Affect: Mood normal.         Behavior: Behavior normal.               Radiology:      XR Hand 3+ View Right    Result Date: 7/27/2023  1.  Mild interval healing noted for distal fifth metacarpal fracture with persistent anterior angulation of distal fracture fragments. 2.  Mild interval healing changes of a proximal fourth metacarpal fracture near the base with stable alignment noted of fracture fragments.  This report was finalized on 7/27/2023 4:08 PM by Dr. Giorgio Sanchez MD.       XR Hand 3+ View Right    Result Date: 7/27/2023  1.  Mild interval healing noted for distal fifth metacarpal fracture with persistent anterior angulation of distal fracture fragments. 2.  Mild interval healing changes of a proximal fourth metacarpal fracture near the base with stable alignment noted of fracture fragments.  This report was finalized on 7/27/2023 4:08 PM by Dr. Giorgio Sanchez MD.      XR Hand 3+ View Right    Result Date: 7/4/2023  1.  Comminuted angulated fracture of the distal fifth metacarpal bone is stable in configuration.  2.  Nondisplaced but angulated fracture of the proximal fifth metacarpal bone is stable. 3.  Decreased soft tissue edema.  This report was finalized on 7/4/2023 12:00 PM by Dr. Giorgio Sanchez MD.      XR Hand 3+ View Right    Result Date: 6/27/2023    1.Oblique fracture at the base of the fourth metacarpal. 2.Comminuted fracture of the distal fifth metacarpal shaft  This report was finalized on 6/27/2023 7:43 AM by Dr. James Reeves MD.             Assessment/Plan        ICD-10-CM ICD-9-CM   1. Closed displaced fracture of fourth metacarpal bone with routine healing, unspecified fracture morphology, subsequent encounter  S62.308D V54.19   2. Closed nondisplaced fracture of neck of fifth metacarpal bone of right hand with routine healing, subsequent encounter  S62.366D V54.19     47-year-old male with notable several week history with comminuted and angulated fourth metacarpal and nondisplaced angulated proximal fifth metacarpal.  Patient has had notable reduction of pain and symptoms following the brace immobilization.  There is notable evidence of fracture healing.  At this point the patient will return back in 3 weeks as he was instructed to discontinue the bracing with monitoring and modifying his activity.  The patient will return back to 3 weeks for repeat radiographs and likely progression to full activity at work.                        This document was signed by Jackson Lozano PA-C July 3, 2023    CC: Margy Dockery APRN      Dictated Utilizing Dragon Dictation:   Please note that portions of this note were completed with a voice recognition program.   Part of this note may be an electronic transcription/translation of spoken language to printed text using the Dragon Dictation System.

## 2023-08-23 ENCOUNTER — OFFICE VISIT (OUTPATIENT)
Dept: ORTHOPEDIC SURGERY | Facility: CLINIC | Age: 48
End: 2023-08-23
Payer: COMMERCIAL

## 2023-08-23 ENCOUNTER — HOSPITAL ENCOUNTER (OUTPATIENT)
Dept: GENERAL RADIOLOGY | Facility: HOSPITAL | Age: 48
Discharge: HOME | End: 2023-08-23
Admitting: PHYSICIAN ASSISTANT
Payer: COMMERCIAL

## 2023-08-23 VITALS — BODY MASS INDEX: 21.07 KG/M2 | WEIGHT: 139 LBS | HEIGHT: 68 IN

## 2023-08-23 DIAGNOSIS — S62.308D: ICD-10-CM

## 2023-08-23 DIAGNOSIS — S62.366D CLOSED NONDISPLACED FRACTURE OF NECK OF FIFTH METACARPAL BONE OF RIGHT HAND WITH ROUTINE HEALING, SUBSEQUENT ENCOUNTER: Primary | ICD-10-CM

## 2023-08-23 PROCEDURE — 73130 X-RAY EXAM OF HAND: CPT

## 2023-08-23 PROCEDURE — 99024 POSTOP FOLLOW-UP VISIT: CPT | Performed by: PHYSICIAN ASSISTANT

## 2023-08-23 NOTE — PROGRESS NOTES
[facility] – [place] [facility] Established Patient Visit          Patient: Juan Capone  YOB: 1975  Date of Encounter: 08/23/2023  PCP: Margy Dockery APRN      Subjective     No chief complaint on file.          History of Present Illness:     Juan Capone is a 47 y.o. male presents today result left fourth and fifth metacarpal fractures initial date of injury 6/25/2023.  The patient has responded well with conservative treatment options and presents today for radiographic review out of immobilization.  He reports no new complaints.  Reports only mild swelling and prominence at the base of the fourth metacarpal region.  He reports this is relatively nonpainful however he has concerns as to the increase in overall size since last office visit.  No other new complaints.    Patient Active Problem List   Diagnosis    Gross hematuria    Acute prostatitis    Incomplete bladder emptying     Past Medical History:   Diagnosis Date    Bladder infection     Compression fracture of thoracic vertebra     Migraine     Prostate enlargement      Past Surgical History:   Procedure Laterality Date    KNEE ARTHROSCOPY Left 02/09/2023    Left Partial Medial Meniscectomy-Dr Francesco Ortiz-[facility]    ORBITAL FRACTURE SURGERY Left     SHOULDER ARTHROSCOPY W/ ROTATOR CUFF REPAIR Right     Dr. Bedolla in [place]    SINUS SURGERY       Social History     Occupational History    Not on file   Tobacco Use    Smoking status: Every Day     Packs/day: 1.00     Types: Cigarettes    Smokeless tobacco: Never   Vaping Use    Vaping Use: Never used   Substance and Sexual Activity    Alcohol use: No    Drug use: No    Sexual activity: Defer    Juan Capone  reports that he has been smoking cigarettes. He has been smoking an average of 1 pack per day. He has never used smokeless tobacco.. I have educated him on the risk of diseases from using tobacco products such as cancer, COPD, and heart disease.     I advised him to quit  and he is not willing to quit.    I spent 3  minutes counseling the patient.          Social History     Social History Narrative    Not on file     Family History   Problem Relation Age of Onset    Hypertension Mother     Cancer Maternal Uncle     Cancer Maternal Grandmother      Current Outpatient Medications   Medication Sig Dispense Refill    buprenorphine-naloxone (SUBOXONE) 8-2 MG per SL tablet       diclofenac (VOLTAREN) 75 MG EC tablet       Diclofenac Sodium (VOLTAREN) 1 % gel gel APPLY 2 GRAMS TO THE AFFECTED JOINT(S) FOUR TIMES A DAY AS NEEDED FOR PAIN      fexofenadine (ALLEGRA) 180 MG tablet       gabapentin (NEURONTIN) 400 MG capsule Take 300 mg by mouth 3 (three) times a day.      traZODone (DESYREL) 50 MG tablet        No current facility-administered medications for this visit.     No Known Allergies         Review of Systems   Constitutional: Negative.   HENT: Negative.     Eyes: Negative.    Cardiovascular: Negative.    Respiratory: Negative.          Sleep apnea   Endocrine: Negative.    Hematologic/Lymphatic: Negative.    Skin: Negative.    Musculoskeletal:  Positive for back pain, joint swelling and neck pain.        Pertinent positives listed in HPI   Gastrointestinal: Negative.    Genitourinary: Negative.    Neurological: Negative.    Psychiatric/Behavioral: Negative.     Allergic/Immunologic: Negative.        Objective      There were no vitals filed for this visit.     BMI is within normal parameters. No other follow-up for BMI required.      Physical Exam  Vitals and nursing note reviewed.   Constitutional:       General: He is not in acute distress.     Appearance: Normal appearance. He is not ill-appearing.   HENT:      Head: Normocephalic and atraumatic.      Right Ear: External ear normal.      Left Ear: External ear normal.      Nose: Nose normal.      Mouth/Throat:      Mouth: Mucous membranes are moist.      Pharynx: Oropharynx is clear.   Eyes:      Extraocular Movements:  Extraocular movements intact.      Conjunctiva/sclera: Conjunctivae normal.      Pupils: Pupils are equal, round, and reactive to light.   Cardiovascular:      Rate and Rhythm: Normal rate.      Pulses: Normal pulses.   Pulmonary:      Effort: Pulmonary effort is normal.   Abdominal:      General: There is no distension.   Musculoskeletal:      Cervical back: Normal range of motion. No rigidity.      Comments: Mild generalized swelling right hand pain to palpation along the fourth and fifth metacarpals.  Patient has mild reduction of prominence of the fourth and fifth MCP joints.  Patient does have prominence at the base of the fourth metacarpal consistent with increasing periosteal callus.  This is not painful to palpation.  Patient has full intact flexion and extension with no significant rotational abnormality.  Neurovascular status grossly intact right upper extremity.   Skin:     General: Skin is warm and dry.      Capillary Refill: Capillary refill takes less than 2 seconds.   Neurological:      General: No focal deficit present.      Mental Status: He is alert and oriented to person, place, and time.   Psychiatric:         Mood and Affect: Mood normal.         Behavior: Behavior normal.               Radiology:    XR Hand 3+ View Right    Result Date: 8/23/2023    Healing fracture of the fifth metacarpal with angulation.  This report was finalized on 8/23/2023 10:41 AM by Dr. Gerard Limon MD.      XR Hand 3+ View Right    Result Date: 7/27/2023  1.  Mild interval healing noted for distal fifth metacarpal fracture with persistent anterior angulation of distal fracture fragments. 2.  Mild interval healing changes of a proximal fourth metacarpal fracture near the base with stable alignment noted of fracture fragments.  This report was finalized on 7/27/2023 4:08 PM by Dr. Giorgio Sanchez MD.      XR Hand 3+ View Right    Result Date: 7/4/2023  1.  Comminuted angulated fracture of the distal fifth metacarpal bone  is stable in configuration. 2.  Nondisplaced but angulated fracture of the proximal fifth metacarpal bone is stable. 3.  Decreased soft tissue edema.  This report was finalized on 7/4/2023 12:00 PM by Dr. Giorgio Sanchez MD.      XR Hand 3+ View Right    Result Date: 6/27/2023    1.Oblique fracture at the base of the fourth metacarpal. 2.Comminuted fracture of the distal fifth metacarpal shaft  This report was finalized on 6/27/2023 7:43 AM by Dr. James Reeves MD.         Assessment/Plan        ICD-10-CM ICD-9-CM   1. Closed nondisplaced fracture of neck of fifth metacarpal bone of right hand with routine healing, subsequent encounter  S62.366D V54.19   2. Closed displaced fracture of fourth metacarpal bone with routine healing, unspecified fracture morphology, subsequent encounter  S62.308D V54.19       47-year-old male with notable several week history of increased healing comminuted angulated fourth metacarpal and nondisplaced and angulated proximal fifth metacarpal fractures.  Patient has increasing overall callus and then it is evident with the mild prominence into the base of the fourth metacarpal.  There is no significant change in alignment or positioning with continuation of healing.  The patient will continue to progress with his activity.  The patient return back in 3 weeks for further evaluation and approaching 3-month history of initial fracture.  He may progress into full activity without any restrictions at this point.                        This document was signed by Jackson Lozano PA-C August 23, 2023    CC: Margy Dockery APRN      Dictated Utilizing Dragon Dictation:   Please note that portions of this note were completed with a voice recognition program.   Part of this note may be an electronic transcription/translation of spoken language to printed text using the Dragon Dictation System.

## 2023-09-15 ENCOUNTER — HOSPITAL ENCOUNTER (OUTPATIENT)
Dept: GENERAL RADIOLOGY | Facility: HOSPITAL | Age: 48
Discharge: HOME | End: 2023-09-15
Admitting: PHYSICIAN ASSISTANT
Payer: COMMERCIAL

## 2023-09-15 ENCOUNTER — OFFICE VISIT (OUTPATIENT)
Dept: ORTHOPEDIC SURGERY | Facility: CLINIC | Age: 48
End: 2023-09-15
Payer: COMMERCIAL

## 2023-09-15 VITALS — WEIGHT: 139 LBS | HEIGHT: 68 IN | BODY MASS INDEX: 21.07 KG/M2

## 2023-09-15 DIAGNOSIS — S62.366D CLOSED NONDISPLACED FRACTURE OF NECK OF FIFTH METACARPAL BONE OF RIGHT HAND WITH ROUTINE HEALING, SUBSEQUENT ENCOUNTER: Primary | ICD-10-CM

## 2023-09-15 DIAGNOSIS — S62.366D CLOSED NONDISPLACED FRACTURE OF NECK OF FIFTH METACARPAL BONE OF RIGHT HAND WITH ROUTINE HEALING, SUBSEQUENT ENCOUNTER: ICD-10-CM

## 2023-09-15 PROCEDURE — 73130 X-RAY EXAM OF HAND: CPT

## 2023-09-15 NOTE — PROGRESS NOTES
[facility] [place] [facility] Established Patient Visit          Patient: Juan Capone  YOB: 1975  Date of Encounter: 9/15/2023  PCP: Margy Dockery APRN      Subjective     Chief Complaint   Patient presents with    Right Hand - Follow-up, Fracture, Pain             History of Present Illness:     Juan Capone is a 47 y.o. male presents today result left fourth and fifth metacarpal fractures initial date of injury 6/25/2023.  The patient has responded well with conservative treatment options and presents today for radiographic review out of immobilization.  He reports no new complaints.  Reports reduction of the previous mild swelling and prominence at the base of the fourth metacarpal region.  He reports this is nonpainful.  No other new complaints.    Patient Active Problem List   Diagnosis    Gross hematuria    Acute prostatitis    Incomplete bladder emptying     Past Medical History:   Diagnosis Date    Bladder infection     Compression fracture of thoracic vertebra     Migraine     Prostate enlargement      Past Surgical History:   Procedure Laterality Date    KNEE ARTHROSCOPY Left 02/09/2023    Left Partial Medial Meniscectomy-Dr Francesco Ortiz-[facility]    ORBITAL FRACTURE SURGERY Left     SHOULDER ARTHROSCOPY W/ ROTATOR CUFF REPAIR Right     Dr. Bedolla in [place]    SINUS SURGERY       Social History     Occupational History    Not on file   Tobacco Use    Smoking status: Every Day     Packs/day: 1.00     Types: Cigarettes    Smokeless tobacco: Never   Vaping Use    Vaping Use: Never used   Substance and Sexual Activity    Alcohol use: No    Drug use: No    Sexual activity: Defer    Juan Capone  reports that he has been smoking cigarettes. He has been smoking an average of 1 pack per day. He has never used smokeless tobacco.. I have educated him on the risk of diseases from using tobacco products such as cancer, COPD, and heart disease.     I advised him to quit and he is not  "willing to quit.    I spent 3  minutes counseling the patient.          Social History     Social History Narrative    Not on file     Family History   Problem Relation Age of Onset    Hypertension Mother     Cancer Maternal Uncle     Cancer Maternal Grandmother      Current Outpatient Medications   Medication Sig Dispense Refill    buprenorphine-naloxone (SUBOXONE) 8-2 MG per SL tablet       diclofenac (VOLTAREN) 75 MG EC tablet       Diclofenac Sodium (VOLTAREN) 1 % gel gel APPLY 2 GRAMS TO THE AFFECTED JOINT(S) FOUR TIMES A DAY AS NEEDED FOR PAIN      fexofenadine (ALLEGRA) 180 MG tablet       gabapentin (NEURONTIN) 400 MG capsule Take 300 mg by mouth 3 (three) times a day.      traZODone (DESYREL) 50 MG tablet        No current facility-administered medications for this visit.     No Known Allergies         Review of Systems   Constitutional: Negative.   HENT: Negative.     Eyes: Negative.    Cardiovascular: Negative.    Respiratory: Negative.          Sleep apnea   Endocrine: Negative.    Hematologic/Lymphatic: Negative.    Skin: Negative.    Musculoskeletal:  Positive for back pain, joint swelling and neck pain.        Pertinent positives listed in HPI   Gastrointestinal: Negative.    Genitourinary: Negative.    Neurological: Negative.    Psychiatric/Behavioral: Negative.     Allergic/Immunologic: Negative.        Objective      Vitals:    09/15/23 1012   Weight: 63 kg (139 lb)   Height: 172.7 cm (68\")        BMI is within normal parameters. No other follow-up for BMI required.      Physical Exam  Vitals and nursing note reviewed.   Constitutional:       General: He is not in acute distress.     Appearance: Normal appearance. He is not ill-appearing.   HENT:      Head: Normocephalic and atraumatic.      Right Ear: External ear normal.      Left Ear: External ear normal.      Nose: Nose normal.      Mouth/Throat:      Mouth: Mucous membranes are moist.      Pharynx: Oropharynx is clear.   Eyes:      Extraocular " Movements: Extraocular movements intact.      Conjunctiva/sclera: Conjunctivae normal.      Pupils: Pupils are equal, round, and reactive to light.   Cardiovascular:      Rate and Rhythm: Normal rate.      Pulses: Normal pulses.   Pulmonary:      Effort: Pulmonary effort is normal.   Abdominal:      General: There is no distension.   Musculoskeletal:      Cervical back: Normal range of motion. No rigidity.      Comments: Reduction of previous swelling right hand pain to palpation along the fourth and fifth metacarpals.  Patient has mild reduction of prominence of the fourth and fifth MCP joints.  Patient does have prominence at the base of the fourth metacarpal consistent with increasing periosteal callus.  This is not painful to palpation.  Patient has full intact flexion and extension with no significant rotational abnormality.  Neurovascular status grossly intact right upper extremity.   Skin:     General: Skin is warm and dry.      Capillary Refill: Capillary refill takes less than 2 seconds.   Neurological:      General: No focal deficit present.      Mental Status: He is alert and oriented to person, place, and time.   Psychiatric:         Mood and Affect: Mood normal.         Behavior: Behavior normal.               Radiology:          XR Hand 3+ View Right    Result Date: 9/15/2023    Healing fracture of the fifth metacarpal neck and the proximal aspect of the fourth metacarpal shaft.  This report was finalized on 9/15/2023 10:15 AM by Dr. Gerard Limon MD.      XR Hand 3+ View Right    Result Date: 8/23/2023    Healing fracture of the fifth metacarpal with angulation.  This report was finalized on 8/23/2023 10:41 AM by Dr. Gerard Limon MD.      XR Hand 3+ View Right    Result Date: 7/27/2023  1.  Mild interval healing noted for distal fifth metacarpal fracture with persistent anterior angulation of distal fracture fragments. 2.  Mild interval healing changes of a proximal fourth metacarpal fracture near  the base with stable alignment noted of fracture fragments.  This report was finalized on 7/27/2023 4:08 PM by Dr. Giorgio Sanchez MD.      XR Hand 3+ View Right    Result Date: 7/4/2023  1.  Comminuted angulated fracture of the distal fifth metacarpal bone is stable in configuration. 2.  Nondisplaced but angulated fracture of the proximal fifth metacarpal bone is stable. 3.  Decreased soft tissue edema.  This report was finalized on 7/4/2023 12:00 PM by Dr. Giorgio Sanchez MD.      XR Hand 3+ View Right    Result Date: 6/27/2023    1.Oblique fracture at the base of the fourth metacarpal. 2.Comminuted fracture of the distal fifth metacarpal shaft  This report was finalized on 6/27/2023 7:43 AM by Dr. James Reeves MD.         Assessment/Plan        ICD-10-CM ICD-9-CM   1. Closed nondisplaced fracture of neck of fifth metacarpal bone of right hand with routine healing, subsequent encounter  S62.366D V54.19       47-year-old male with notable several week history of increased healing comminuted angulated fourth metacarpal and nondisplaced and angulated proximal fifth metacarpal fractures.  Patient has increasing overall callus.  Patient has full range of motion with no rotational abnormalities.  As result of this the patient will continue to progress with his normal daily activities and will return back on an as-needed basis upon any complication or worsening of pain/complaints.  Follow-up when necessary.                    This document was signed by Jackson Lozano PA-C September 15, 2023    CC: Margy Dockery APRN      Dictated Utilizing Dragon Dictation:   Please note that portions of this note were completed with a voice recognition program.   Part of this note may be an electronic transcription/translation of spoken language to printed text using the Dragon Dictation System.

## 2023-09-15 NOTE — LETTER
September 15, 2023     Patient: Juan Capone   YOB: 1975   Date of Visit: 9/15/2023       To Whom It May Concern:    It is my medical opinion that Juan Capone  may return to full activity no restrictions beginning October 2, 2023 . The patient should remain out of work until the previous date.            Sincerely,        Jackson Lozano PA-C

## 2024-04-25 ENCOUNTER — TRANSCRIBE ORDERS (OUTPATIENT)
Dept: ADMINISTRATIVE | Facility: HOSPITAL | Age: 49
End: 2024-04-25
Payer: MEDICAID

## 2024-04-25 DIAGNOSIS — R07.89 OTHER CHEST PAIN: Primary | ICD-10-CM

## 2024-05-01 ENCOUNTER — HOSPITAL ENCOUNTER (OUTPATIENT)
Facility: HOSPITAL | Age: 49
Discharge: HOME | End: 2024-05-01
Admitting: NURSE PRACTITIONER
Payer: MEDICAID

## 2024-05-01 DIAGNOSIS — R07.89 OTHER CHEST PAIN: ICD-10-CM

## 2024-05-01 LAB
BH CV ECHO MEAS - ACS: 1.91 CM
BH CV ECHO MEAS - AO MAX PG: 4.5 MMHG
BH CV ECHO MEAS - AO MEAN PG: 2.5 MMHG
BH CV ECHO MEAS - AO V2 MAX: 106 CM/SEC
BH CV ECHO MEAS - AO V2 VTI: 26.5 CM
BH CV ECHO MEAS - EDV(CUBED): 148.9 ML
BH CV ECHO MEAS - EDV(MOD-SP4): 73.6 ML
BH CV ECHO MEAS - EF(MOD-SP4): 54.2 %
BH CV ECHO MEAS - ESV(CUBED): 35 ML
BH CV ECHO MEAS - ESV(MOD-SP4): 33.7 ML
BH CV ECHO MEAS - FS: 38.3 %
BH CV ECHO MEAS - IVS/LVPW: 0.93 CM
BH CV ECHO MEAS - IVSD: 0.84 CM
BH CV ECHO MEAS - LA DIMENSION: 3 CM
BH CV ECHO MEAS - LV DIASTOLIC VOL/BSA (35-75): 42 CM2
BH CV ECHO MEAS - LV MASS(C)D: 167 GRAMS
BH CV ECHO MEAS - LV SYSTOLIC VOL/BSA (12-30): 19.2 CM2
BH CV ECHO MEAS - LVIDD: 5.3 CM
BH CV ECHO MEAS - LVIDS: 3.3 CM
BH CV ECHO MEAS - LVPWD: 0.9 CM
BH CV ECHO MEAS - PA ACC TIME: 0.1 SEC
BH CV ECHO MEAS - RAP SYSTOLE: 10 MMHG
BH CV ECHO MEAS - RVSP: 29 MMHG
BH CV ECHO MEAS - SV(MOD-SP4): 39.9 ML
BH CV ECHO MEAS - SVI(MOD-SP4): 22.8 ML/M2
BH CV ECHO MEAS - TR MAX PG: 19 MMHG
BH CV ECHO MEAS - TR MAX VEL: 217 CM/SEC

## 2024-05-01 PROCEDURE — 93306 TTE W/DOPPLER COMPLETE: CPT

## 2024-05-02 ENCOUNTER — TRANSCRIBE ORDERS (OUTPATIENT)
Dept: ADMINISTRATIVE | Facility: HOSPITAL | Age: 49
End: 2024-05-02
Payer: MEDICAID

## 2024-05-02 DIAGNOSIS — E87.1 HYPO-OSMOLALITY AND HYPONATREMIA: Primary | ICD-10-CM

## 2024-05-13 ENCOUNTER — HOSPITAL ENCOUNTER (OUTPATIENT)
Facility: HOSPITAL | Age: 49
Discharge: HOME | End: 2024-05-13
Admitting: NURSE PRACTITIONER
Payer: MEDICAID

## 2024-05-13 ENCOUNTER — TRANSCRIBE ORDERS (OUTPATIENT)
Dept: ADMINISTRATIVE | Facility: HOSPITAL | Age: 49
End: 2024-05-13
Payer: MEDICAID

## 2024-05-13 DIAGNOSIS — M25.562 LEFT KNEE PAIN, UNSPECIFIED CHRONICITY: Primary | ICD-10-CM

## 2024-05-13 DIAGNOSIS — M25.562 LEFT KNEE PAIN, UNSPECIFIED CHRONICITY: ICD-10-CM

## 2024-05-13 PROCEDURE — 73560 X-RAY EXAM OF KNEE 1 OR 2: CPT | Performed by: RADIOLOGY

## 2024-05-13 PROCEDURE — 73560 X-RAY EXAM OF KNEE 1 OR 2: CPT

## 2024-10-23 ENCOUNTER — HOSPITAL ENCOUNTER (EMERGENCY)
Facility: HOSPITAL | Age: 49
Discharge: LEFT BEFORE DISCHARGE | End: 2024-10-23
Payer: MEDICAID

## 2024-10-23 ENCOUNTER — APPOINTMENT (OUTPATIENT)
Dept: GENERAL RADIOLOGY | Facility: HOSPITAL | Age: 49
End: 2024-10-23
Payer: MEDICAID

## 2024-10-23 VITALS
HEIGHT: 69 IN | RESPIRATION RATE: 14 BRPM | BODY MASS INDEX: 20.73 KG/M2 | SYSTOLIC BLOOD PRESSURE: 118 MMHG | OXYGEN SATURATION: 98 % | DIASTOLIC BLOOD PRESSURE: 71 MMHG | HEART RATE: 59 BPM | TEMPERATURE: 97.7 F | WEIGHT: 140 LBS

## 2024-10-23 LAB
ALBUMIN SERPL-MCNC: 4.5 G/DL (ref 3.5–5.2)
ALBUMIN/GLOB SERPL: 1.6 G/DL
ALP SERPL-CCNC: 84 U/L (ref 39–117)
ALT SERPL W P-5'-P-CCNC: 15 U/L (ref 1–41)
ANION GAP SERPL CALCULATED.3IONS-SCNC: 10.3 MMOL/L (ref 5–15)
AST SERPL-CCNC: 22 U/L (ref 1–40)
BASOPHILS # BLD AUTO: 0.05 10*3/MM3 (ref 0–0.2)
BASOPHILS NFR BLD AUTO: 0.7 % (ref 0–1.5)
BILIRUB SERPL-MCNC: 0.3 MG/DL (ref 0–1.2)
BUN SERPL-MCNC: 11 MG/DL (ref 6–20)
BUN/CREAT SERPL: 11 (ref 7–25)
CALCIUM SPEC-SCNC: 9.3 MG/DL (ref 8.6–10.5)
CHLORIDE SERPL-SCNC: 101 MMOL/L (ref 98–107)
CO2 SERPL-SCNC: 29.7 MMOL/L (ref 22–29)
CREAT SERPL-MCNC: 1 MG/DL (ref 0.76–1.27)
DEPRECATED RDW RBC AUTO: 44.6 FL (ref 37–54)
EGFRCR SERPLBLD CKD-EPI 2021: 92.3 ML/MIN/1.73
EOSINOPHIL # BLD AUTO: 0.15 10*3/MM3 (ref 0–0.4)
EOSINOPHIL NFR BLD AUTO: 2.2 % (ref 0.3–6.2)
ERYTHROCYTE [DISTWIDTH] IN BLOOD BY AUTOMATED COUNT: 13.2 % (ref 12.3–15.4)
GLOBULIN UR ELPH-MCNC: 2.9 GM/DL
GLUCOSE SERPL-MCNC: 84 MG/DL (ref 65–99)
HCT VFR BLD AUTO: 45.7 % (ref 37.5–51)
HGB BLD-MCNC: 15.3 G/DL (ref 13–17.7)
HOLD SPECIMEN: NORMAL
HOLD SPECIMEN: NORMAL
IMM GRANULOCYTES # BLD AUTO: 0.02 10*3/MM3 (ref 0–0.05)
IMM GRANULOCYTES NFR BLD AUTO: 0.3 % (ref 0–0.5)
LYMPHOCYTES # BLD AUTO: 2.13 10*3/MM3 (ref 0.7–3.1)
LYMPHOCYTES NFR BLD AUTO: 31.8 % (ref 19.6–45.3)
MCH RBC QN AUTO: 31 PG (ref 26.6–33)
MCHC RBC AUTO-ENTMCNC: 33.5 G/DL (ref 31.5–35.7)
MCV RBC AUTO: 92.7 FL (ref 79–97)
MONOCYTES # BLD AUTO: 0.63 10*3/MM3 (ref 0.1–0.9)
MONOCYTES NFR BLD AUTO: 9.4 % (ref 5–12)
NEUTROPHILS NFR BLD AUTO: 3.71 10*3/MM3 (ref 1.7–7)
NEUTROPHILS NFR BLD AUTO: 55.6 % (ref 42.7–76)
NRBC BLD AUTO-RTO: 0 /100 WBC (ref 0–0.2)
PLATELET # BLD AUTO: 188 10*3/MM3 (ref 140–450)
PMV BLD AUTO: 9.7 FL (ref 6–12)
POTASSIUM SERPL-SCNC: 4.1 MMOL/L (ref 3.5–5.2)
PROT SERPL-MCNC: 7.4 G/DL (ref 6–8.5)
QT INTERVAL: 406 MS
QTC INTERVAL: 408 MS
RBC # BLD AUTO: 4.93 10*6/MM3 (ref 4.14–5.8)
SODIUM SERPL-SCNC: 141 MMOL/L (ref 136–145)
TROPONIN T SERPL HS-MCNC: <6 NG/L
WBC NRBC COR # BLD AUTO: 6.69 10*3/MM3 (ref 3.4–10.8)
WHOLE BLOOD HOLD COAG: NORMAL
WHOLE BLOOD HOLD SPECIMEN: NORMAL

## 2024-10-23 PROCEDURE — 85025 COMPLETE CBC W/AUTO DIFF WBC: CPT | Performed by: STUDENT IN AN ORGANIZED HEALTH CARE EDUCATION/TRAINING PROGRAM

## 2024-10-23 PROCEDURE — 36415 COLL VENOUS BLD VENIPUNCTURE: CPT | Performed by: STUDENT IN AN ORGANIZED HEALTH CARE EDUCATION/TRAINING PROGRAM

## 2024-10-23 PROCEDURE — 93005 ELECTROCARDIOGRAM TRACING: CPT | Performed by: STUDENT IN AN ORGANIZED HEALTH CARE EDUCATION/TRAINING PROGRAM

## 2024-10-23 PROCEDURE — 84484 ASSAY OF TROPONIN QUANT: CPT | Performed by: STUDENT IN AN ORGANIZED HEALTH CARE EDUCATION/TRAINING PROGRAM

## 2024-10-23 PROCEDURE — 99211 OFF/OP EST MAY X REQ PHY/QHP: CPT

## 2024-10-23 PROCEDURE — 71045 X-RAY EXAM CHEST 1 VIEW: CPT | Performed by: RADIOLOGY

## 2024-10-23 PROCEDURE — 71045 X-RAY EXAM CHEST 1 VIEW: CPT

## 2024-10-23 PROCEDURE — 80053 COMPREHEN METABOLIC PANEL: CPT | Performed by: STUDENT IN AN ORGANIZED HEALTH CARE EDUCATION/TRAINING PROGRAM

## 2024-10-23 RX ORDER — ASPIRIN 81 MG/1
324 TABLET, CHEWABLE ORAL ONCE
Status: COMPLETED | OUTPATIENT
Start: 2024-10-23 | End: 2024-10-23

## 2024-10-23 RX ORDER — SODIUM CHLORIDE 0.9 % (FLUSH) 0.9 %
10 SYRINGE (ML) INJECTION AS NEEDED
Status: DISCONTINUED | OUTPATIENT
Start: 2024-10-23 | End: 2024-10-23 | Stop reason: HOSPADM

## 2024-10-23 RX ADMIN — ASPIRIN 324 MG: 81 TABLET, CHEWABLE ORAL at 13:13

## 2024-10-23 NOTE — ED NOTES
Pt returned from xray and said he was going to leave because has had all the tests done and doesn't want to wait around for results. Pt educated on risks of leaving against medical advice, verbalized understanding. Provider notified.

## 2025-01-09 ENCOUNTER — TRANSCRIBE ORDERS (OUTPATIENT)
Dept: ADMINISTRATIVE | Facility: HOSPITAL | Age: 50
End: 2025-01-09
Payer: OTHER MISCELLANEOUS

## 2025-01-09 ENCOUNTER — HOSPITAL ENCOUNTER (OUTPATIENT)
Dept: GENERAL RADIOLOGY | Facility: HOSPITAL | Age: 50
Discharge: HOME | End: 2025-01-09
Admitting: NURSE PRACTITIONER
Payer: OTHER MISCELLANEOUS

## 2025-01-09 DIAGNOSIS — M25.571 PAIN, JOINT, ANKLE AND FOOT, RIGHT: ICD-10-CM

## 2025-01-09 DIAGNOSIS — M25.561 RIGHT KNEE PAIN, UNSPECIFIED CHRONICITY: ICD-10-CM

## 2025-01-09 DIAGNOSIS — M25.571 PAIN, JOINT, ANKLE AND FOOT, RIGHT: Primary | ICD-10-CM

## 2025-01-09 PROCEDURE — 73610 X-RAY EXAM OF ANKLE: CPT

## 2025-01-09 PROCEDURE — 73564 X-RAY EXAM KNEE 4 OR MORE: CPT

## 2025-04-28 ENCOUNTER — TRANSCRIBE ORDERS (OUTPATIENT)
Dept: ADMINISTRATIVE | Facility: HOSPITAL | Age: 50
End: 2025-04-28
Payer: COMMERCIAL

## 2025-04-28 DIAGNOSIS — G62.9 POLYNEUROPATHY: Primary | ICD-10-CM
